# Patient Record
Sex: FEMALE | Race: WHITE
[De-identification: names, ages, dates, MRNs, and addresses within clinical notes are randomized per-mention and may not be internally consistent; named-entity substitution may affect disease eponyms.]

---

## 2018-02-05 ENCOUNTER — HOSPITAL ENCOUNTER (EMERGENCY)
Age: 48
Discharge: LEFT BEFORE DISCHARGE | End: 2018-02-05

## 2018-02-05 ENCOUNTER — HOSPITAL ENCOUNTER (EMERGENCY)
Dept: HOSPITAL 91 - E/R | Age: 48
Discharge: LEFT BEFORE DISCHARGE | End: 2018-02-05
Payer: COMMERCIAL

## 2018-02-05 DIAGNOSIS — R55: Primary | ICD-10-CM

## 2018-02-05 PROCEDURE — 93005 ELECTROCARDIOGRAM TRACING: CPT

## 2018-02-05 PROCEDURE — 99283 EMERGENCY DEPT VISIT LOW MDM: CPT

## 2018-10-30 ENCOUNTER — HOSPITAL ENCOUNTER (OUTPATIENT)
Dept: HOSPITAL 91 - EEG | Age: 48
Discharge: HOME | End: 2018-10-30
Payer: COMMERCIAL

## 2018-10-30 ENCOUNTER — HOSPITAL ENCOUNTER (OUTPATIENT)
Age: 48
Discharge: HOME | End: 2018-10-30

## 2018-10-30 DIAGNOSIS — G40.909: Primary | ICD-10-CM

## 2018-10-30 PROCEDURE — 95819 EEG AWAKE AND ASLEEP: CPT

## 2021-06-01 ENCOUNTER — OFFICE (OUTPATIENT)
Dept: URBAN - METROPOLITAN AREA CLINIC 67 | Facility: CLINIC | Age: 51
End: 2021-06-01

## 2021-06-01 VITALS
DIASTOLIC BLOOD PRESSURE: 60 MMHG | SYSTOLIC BLOOD PRESSURE: 120 MMHG | HEIGHT: 61 IN | WEIGHT: 158 LBS | TEMPERATURE: 97.6 F

## 2021-06-01 DIAGNOSIS — K21.9 GERD: ICD-10-CM

## 2021-06-01 DIAGNOSIS — R14.0 ABDOMINAL BLOATING: ICD-10-CM

## 2021-06-01 DIAGNOSIS — R11.2 NAUSEA AND VOMITING: ICD-10-CM

## 2021-06-01 DIAGNOSIS — R14.3 FLATULENCE: ICD-10-CM

## 2021-06-01 DIAGNOSIS — Z12.11 SCREENING FOR COLON CANCER: ICD-10-CM

## 2021-06-01 PROCEDURE — 99243 OFF/OP CNSLTJ NEW/EST LOW 30: CPT | Performed by: NURSE PRACTITIONER

## 2021-06-01 NOTE — SERVICEHPINOTES
This is a   50   year old  female   seen   in consultation at the request of Dr. JESUS JUAN  . Pt w/ chronic digestive issues manifested by upset stomach, nausea, vomiting and acid reflux since a young age,  as well as increased gas/bloating for which she takes Gas-X in the evening and antacids a few times a week, referred for her initial screening colonoscopy. She was seen in UC 3/2021 for significant N/V and "bad" heartburn so she could not "eat or drink anything for 3 days" along w/ painful swallowing and CP EKG in UC was negative, was d/c'd after a GI cocktail on Prevacid 40 mg which she took for a few weeks and now taking intermittently. She would also like o have an EGD to evaluate her upper GI sx.

## 2021-09-27 ENCOUNTER — AMBULATORY SURGICAL CENTER (OUTPATIENT)
Dept: URBAN - METROPOLITAN AREA SURGERY 42 | Facility: SURGERY | Age: 51
End: 2021-09-27

## 2021-09-27 VITALS
TEMPERATURE: 97 F | WEIGHT: 155 LBS | HEART RATE: 58 BPM | HEIGHT: 61 IN | DIASTOLIC BLOOD PRESSURE: 64 MMHG | SYSTOLIC BLOOD PRESSURE: 115 MMHG | RESPIRATION RATE: 13 BRPM | OXYGEN SATURATION: 98 %

## 2021-09-27 DIAGNOSIS — R19.5 OTHER FECAL ABNORMALITIES: ICD-10-CM

## 2021-09-27 DIAGNOSIS — K21.9 GASTRO-ESOPHAGEAL REFLUX DISEASE WITHOUT ESOPHAGITIS: ICD-10-CM

## 2021-09-27 PROCEDURE — 45380 COLONOSCOPY AND BIOPSY: CPT | Performed by: INTERNAL MEDICINE

## 2021-09-27 PROCEDURE — 43239 EGD BIOPSY SINGLE/MULTIPLE: CPT | Performed by: INTERNAL MEDICINE

## 2021-09-27 NOTE — SERVICEHPINOTES
This is a 50 year old female seen in consultation at the request of Dr. JESUS JUAN. Pt w/ chronic digestive issues manifested by upset stomach, nausea, vomiting and acid reflux since a young age, as well as increased gas/bloating for which she takes Gas-X in the evening and antacids a few times a week, referred for her initial screening colonoscopy. She was seen in UC 3/2021 for significant N/V and "bad" heartburn so she could not "eat or drink anything for 3 days" along w/ painful swallowing and CP EKG in UC was negative, was d/c'd after a GI cocktail on Prevacid 40 mg which she took for a few weeks and now taking intermittently. She would also like o have an EGD to evaluate her upper GI sx. Also reports occasional loose stools

## 2021-10-05 ENCOUNTER — OFFICE (OUTPATIENT)
Dept: URBAN - METROPOLITAN AREA CLINIC 67 | Facility: CLINIC | Age: 51
End: 2021-10-05

## 2021-10-05 VITALS
DIASTOLIC BLOOD PRESSURE: 80 MMHG | TEMPERATURE: 98.1 F | HEIGHT: 61 IN | SYSTOLIC BLOOD PRESSURE: 120 MMHG | WEIGHT: 158 LBS

## 2021-10-05 DIAGNOSIS — K21.9 GERD: ICD-10-CM

## 2021-10-05 DIAGNOSIS — R14.1 BLOATING, GAS PAIN: ICD-10-CM

## 2021-10-05 PROCEDURE — 99214 OFFICE O/P EST MOD 30 MIN: CPT | Performed by: INTERNAL MEDICINE

## 2021-10-05 RX ORDER — OMEPRAZOLE 40 MG/1
CAPSULE, DELAYED RELEASE ORAL
Qty: 0 | Status: CANCELLED
End: 2021-12-07

## 2021-10-05 NOTE — SERVICEHPINOTES
Patient previously seen with the following notes:BR6/1/21: (per Jessica escalante NP)BRPt w/ chronic digestive issues manifested by upset stomach, nausea, vomiting and acid reflux since a young age, as well as increased gas/bloating for which she takes Gas-X in the evening and antacids a few times a week, referred for her initial screening colonoscopy. She was seen in  3/2021 for significant N/V and "bad" heartburn so she could not "eat or drink anything for 3 days" along w/ painful swallowing and CP EKG in UC was negative, was d/c'd after a GI cocktail on Prevacid 40 mg which she took for a few weeks and now taking intermittently. She would also like o have an EGD to evaluate her upper GI sx. EGD and colonoscopy performed 9/27/21BRBiopsies of the proximal esophagus is normal. Distal esophagus shows eosinophilic esophagitis. Duodenal biopsies were unremarkable. Random colon biopsies were normal.  10/5/21: Patient has ongoing symptoms with abdominal bloating and heartburn some tenderness in the lower esophagus area.  She says she has seen an allergist and has been told may have allergies to peanuts and hazelnuts as well as possible seasonal allergies.  The allergist told patient for GI allergies to follow-up with gastroenterology.  She is taking Gas-X which helps with bloating.  She is taking Pepcid AC for acid reflux.